# Patient Record
Sex: FEMALE | Race: WHITE | URBAN - METROPOLITAN AREA
[De-identification: names, ages, dates, MRNs, and addresses within clinical notes are randomized per-mention and may not be internally consistent; named-entity substitution may affect disease eponyms.]

---

## 2021-04-20 ENCOUNTER — OFFICE VISIT (OUTPATIENT)
Dept: URBAN - METROPOLITAN AREA CLINIC 75 | Facility: CLINIC | Age: 71
End: 2021-04-20
Payer: MEDICARE

## 2021-04-20 PROCEDURE — 92133 CPTRZD OPH DX IMG PST SGM ON: CPT | Performed by: OPTOMETRIST

## 2021-04-20 PROCEDURE — 92134 CPTRZ OPH DX IMG PST SGM RTA: CPT | Performed by: OPTOMETRIST

## 2021-04-20 PROCEDURE — 99204 OFFICE O/P NEW MOD 45 MIN: CPT | Performed by: OPTOMETRIST

## 2021-04-20 ASSESSMENT — KERATOMETRY
OD: 42.13
OS: 41.00

## 2021-04-20 ASSESSMENT — INTRAOCULAR PRESSURE
OS: 16
OD: 23

## 2021-04-20 ASSESSMENT — VISUAL ACUITY
OS: 20/30
OD: 20/30

## 2021-04-20 NOTE — IMPRESSION/PLAN
Impression: Combined forms of age-related cataract, bilateral: H25.813. Good dilator. No retinal clearance needed. Plan: Cataracts account for the patient's complaints. Discussed all risks, benefits, procedures and recovery. Patient understands changing glasses will not improve vision. Patient desires to have surgery.

## 2021-04-20 NOTE — IMPRESSION/PLAN
Impression: Dry eye syndrome of bilateral lacrimal glands: H04.12-Dry eyes account for the patient's complaints. Plan: Recommend artifical tears 2-3x daily & warm compresses at night time. Educated pt on brands of good lubricating drops.

## 2021-04-20 NOTE — IMPRESSION/PLAN
Impression: Open angle with borderline findings, low risk, bilateral: H40.013- IOP stable. G-suspect based on appearance of nerves. Plan: Observe.

## 2021-06-22 ENCOUNTER — PRE-OPERATIVE VISIT (OUTPATIENT)
Dept: URBAN - METROPOLITAN AREA CLINIC 71 | Facility: CLINIC | Age: 71
End: 2021-06-22
Payer: MEDICARE

## 2021-06-22 DIAGNOSIS — H43.813 VITREOUS DEGENERATION, BILATERAL: ICD-10-CM

## 2021-06-22 DIAGNOSIS — H25.812 COMBINED FORMS OF AGE-RELATED CATARACT, LEFT EYE: ICD-10-CM

## 2021-06-22 PROCEDURE — 92136 OPHTHALMIC BIOMETRY: CPT | Performed by: OPHTHALMOLOGY

## 2021-06-22 PROCEDURE — 92012 INTRM OPH EXAM EST PATIENT: CPT | Performed by: OPHTHALMOLOGY

## 2021-06-22 ASSESSMENT — PACHYMETRY
OD: 25.07
OS: 3.30
OD: 3.13
OS: 24.88

## 2021-07-02 ENCOUNTER — OFFICE VISIT (OUTPATIENT)
Dept: URBAN - METROPOLITAN AREA CLINIC 70 | Facility: CLINIC | Age: 71
End: 2021-07-02
Payer: MEDICARE

## 2021-07-02 DIAGNOSIS — H40.013 OPEN ANGLE WITH BORDERLINE FINDINGS, LOW RISK, BILATERAL: ICD-10-CM

## 2021-07-02 DIAGNOSIS — H25.813 COMBINED FORMS OF AGE-RELATED CATARACT, BILATERAL: Primary | ICD-10-CM

## 2021-07-02 PROCEDURE — 92134 CPTRZ OPH DX IMG PST SGM RTA: CPT | Performed by: OPHTHALMOLOGY

## 2021-07-02 PROCEDURE — 99204 OFFICE O/P NEW MOD 45 MIN: CPT | Performed by: OPHTHALMOLOGY

## 2021-07-02 ASSESSMENT — INTRAOCULAR PRESSURE
OD: 22
OS: 16

## 2021-07-02 NOTE — IMPRESSION/PLAN
Impression: Combined Cataracts, OU. Plan: Exam and OCT reveal the retina is stable. Schedule cataract surgery with [facility] [address] [place] [address].

## 2021-07-07 NOTE — IMPRESSION/PLAN
Impression: Combined forms of age-related cataract, bilateral: H25.813. Plan: Discussed diagnosis in detail with patient. Discussed risks and benefits and patient understands. Discussed treatment options with patient. Surgical treatment is necessary to improve vision. Surgical risks and benefits were discussed, explained and understood by patient. Patient elects to have surgery. Pre op instructions given and understood. Lid scrubs and hygiene were explained. Patient instructed to use artificial tears as needed. Post op instructions given and understood. Continue using current medication(s). PROCEED WITH CARE PLUS CATARACT SURGERY WITH ORA, OD FIRST, NEAR, THEN OS, DISTANCE, DEXYCU, CS DONE.

## 2021-07-12 ENCOUNTER — SURGERY (OUTPATIENT)
Dept: URBAN - METROPOLITAN AREA SURGERY 45 | Facility: SURGERY | Age: 71
End: 2021-07-12
Payer: MEDICARE

## 2021-07-12 PROCEDURE — G8918 PT W/O PREOP ORDER IV AB PRO: HCPCS | Performed by: CLINIC/CENTER

## 2021-07-12 PROCEDURE — G8907 PT DOC NO EVENTS ON DISCHARG: HCPCS | Performed by: CLINIC/CENTER

## 2021-07-13 ENCOUNTER — POST-OPERATIVE VISIT (OUTPATIENT)
Dept: URBAN - METROPOLITAN AREA CLINIC 75 | Facility: CLINIC | Age: 71
End: 2021-07-13
Payer: MEDICARE

## 2021-07-13 DIAGNOSIS — Z48.810 ENCOUNTER FOR SURGICAL AFTERCARE FOLLOWING SURGERY ON A SENSE ORGAN: Primary | ICD-10-CM

## 2021-07-13 PROCEDURE — 99024 POSTOP FOLLOW-UP VISIT: CPT | Performed by: OPHTHALMOLOGY

## 2021-07-13 ASSESSMENT — INTRAOCULAR PRESSURE
OD: 18
OS: 10

## 2021-07-13 NOTE — IMPRESSION/PLAN
Impression:  Encounter for surgical aftercare following surgery on a sense organ  Z48.810. Plan: Eye looks good.   Ok to proceed with fellow eye

## 2021-07-19 ENCOUNTER — SURGERY (OUTPATIENT)
Dept: URBAN - METROPOLITAN AREA SURGERY 45 | Facility: SURGERY | Age: 71
End: 2021-07-19
Payer: MEDICARE

## 2021-07-19 ENCOUNTER — SURGERY (OUTPATIENT)
Dept: URBAN - METROPOLITAN AREA SURGERY 44 | Facility: SURGERY | Age: 71
End: 2021-07-19
Payer: MEDICARE

## 2021-07-19 PROCEDURE — 66984 XCAPSL CTRC RMVL W/O ECP: CPT | Performed by: OPHTHALMOLOGY

## 2021-07-19 PROCEDURE — G8918 PT W/O PREOP ORDER IV AB PRO: HCPCS | Performed by: CLINIC/CENTER

## 2021-07-19 PROCEDURE — G8907 PT DOC NO EVENTS ON DISCHARG: HCPCS | Performed by: CLINIC/CENTER

## 2021-07-20 ENCOUNTER — POST-OPERATIVE VISIT (OUTPATIENT)
Dept: URBAN - METROPOLITAN AREA CLINIC 75 | Facility: CLINIC | Age: 71
End: 2021-07-20
Payer: MEDICARE

## 2021-07-20 DIAGNOSIS — Z96.1 PRESENCE OF INTRAOCULAR LENS: Primary | ICD-10-CM

## 2021-07-20 PROCEDURE — 99024 POSTOP FOLLOW-UP VISIT: CPT | Performed by: OPTOMETRIST

## 2021-07-20 ASSESSMENT — INTRAOCULAR PRESSURE
OS: 15
OD: 15

## 2021-07-20 NOTE — IMPRESSION/PLAN
Impression: S/P Cataract Extraction by phacoemulsification with IOL placement; ORA OS - 1 Day. Presence of intraocular lens  Z96.1- Educated patient on findings. Plan: Recommend artificial tears 2-3x daily to promote healing. IOL's centered. Retinal Detachment signs and symptoms explained.
Yes

## 2021-08-02 ENCOUNTER — SURGERY (OUTPATIENT)
Dept: URBAN - METROPOLITAN AREA SURGERY 44 | Facility: SURGERY | Age: 71
End: 2021-08-02
Payer: MEDICARE

## 2021-08-02 PROCEDURE — 66984 XCAPSL CTRC RMVL W/O ECP: CPT | Performed by: OPHTHALMOLOGY

## 2021-08-17 ENCOUNTER — POST-OPERATIVE VISIT (OUTPATIENT)
Dept: URBAN - METROPOLITAN AREA CLINIC 75 | Facility: CLINIC | Age: 71
End: 2021-08-17
Payer: MEDICARE

## 2021-08-17 DIAGNOSIS — H52.4 PRESBYOPIA: Primary | ICD-10-CM

## 2021-08-17 PROCEDURE — 92015 DETERMINE REFRACTIVE STATE: CPT | Performed by: OPTOMETRIST

## 2021-08-17 PROCEDURE — 99024 POSTOP FOLLOW-UP VISIT: CPT | Performed by: OPTOMETRIST

## 2021-08-17 PROCEDURE — 92310 CONTACT LENS FITTING OU: CPT | Performed by: OPTOMETRIST

## 2021-08-17 ASSESSMENT — VISUAL ACUITY
OS: 20/20
OD: 20/20

## 2021-08-17 ASSESSMENT — INTRAOCULAR PRESSURE
OS: 8
OD: 15

## 2021-08-17 NOTE — IMPRESSION/PLAN
Impression: S/P Cataract Extraction by phacoemulsification with IOL placement; ORA OU - 29 Days. Presence of intraocular lens  Z96.1- Educated pt on findings. Refraction completed. Recommend contact in OS to help give her a better distance prescription. Pt elects to use CTL in OS only. Plan: Updated glasses and CTL OS RX dispensed. Recommend pt continue use of Systane OU.

## 2022-03-25 ENCOUNTER — OFFICE VISIT (OUTPATIENT)
Dept: URBAN - METROPOLITAN AREA CLINIC 71 | Facility: CLINIC | Age: 72
End: 2022-03-25
Payer: MEDICARE

## 2022-03-25 DIAGNOSIS — H04.123 DRY EYE SYNDROME OF BILATERAL LACRIMAL GLANDS: ICD-10-CM

## 2022-03-25 PROCEDURE — 99214 OFFICE O/P EST MOD 30 MIN: CPT | Performed by: OPHTHALMOLOGY

## 2022-03-25 ASSESSMENT — INTRAOCULAR PRESSURE
OD: 16
OS: 12

## 2022-03-25 NOTE — IMPRESSION/PLAN
Impression: Presence of intraocular lens: Z96.1. Monovision Plan: Informed patient that her issues with distance vision are due to the monovision chosen at the time of cataract surgery. Informed patient that adjustment could be made with refractive surgery, or but glasses Rx can help improve vision or consider getting contact lens for OD to help balance the eyes.

## 2022-03-25 NOTE — IMPRESSION/PLAN
Impression: Vitreous degeneration, bilateral: H43.813. No holes or tears Plan: Discussed. Contact office if any new or worsening symptoms.  Continue to monitor

## 2022-03-25 NOTE — IMPRESSION/PLAN
Impression: Dry eye syndrome of bilateral lacrimal glands: H04.123.  Plan: Recommend ATs up to QID for dryness

## 2022-05-05 ENCOUNTER — OFFICE VISIT (OUTPATIENT)
Dept: URBAN - METROPOLITAN AREA CLINIC 71 | Facility: CLINIC | Age: 72
End: 2022-05-05

## 2022-05-05 PROCEDURE — 92310 CONTACT LENS FITTING OU: CPT

## 2022-05-05 ASSESSMENT — VISUAL ACUITY
OD: 20/20
OS: 20/20

## 2022-05-05 ASSESSMENT — INTRAOCULAR PRESSURE
OD: 14
OS: 12

## 2022-05-05 NOTE — IMPRESSION/PLAN
Impression: Presbyopia: H52.4. Will trial contact lens for 1-2 weeks and if patient likes contact lens, will finalize Rx. BCVA in OS w/ contact lens: 20/20-1 Patient to trial OD contact lens as well. Plan: Dispensed new glasses Rx today and discussed changes and possible adaptation period. Patient to call if any issues with new glasses or occur.

## 2022-05-12 ENCOUNTER — OFFICE VISIT (OUTPATIENT)
Dept: URBAN - METROPOLITAN AREA CLINIC 71 | Facility: CLINIC | Age: 72
End: 2022-05-12
Payer: MEDICARE

## 2022-05-12 DIAGNOSIS — Z96.1 PRESENCE OF INTRAOCULAR LENS: ICD-10-CM

## 2022-05-12 DIAGNOSIS — H52.4 PRESBYOPIA: ICD-10-CM

## 2022-05-12 DIAGNOSIS — H04.123 DRY EYE SYNDROME OF BILATERAL LACRIMAL GLANDS: Primary | ICD-10-CM

## 2022-05-12 DIAGNOSIS — H52.223 REGULAR ASTIGMATISM, BILATERAL: ICD-10-CM

## 2022-05-12 PROCEDURE — 92310 CONTACT LENS FITTING OU: CPT

## 2022-05-12 ASSESSMENT — KERATOMETRY
OD: 41.49
OS: 40.63

## 2022-05-12 ASSESSMENT — INTRAOCULAR PRESSURE
OS: 16
OD: 15

## 2022-05-12 NOTE — IMPRESSION/PLAN
Impression: Regular astigmatism, bilateral: H52.223.
+0.75. OD rotated right by 10 degrees. OS not rotated. Plan: Dispensed new contact lens Rx today. Patient to call if any issues with contact lenses occur.

## 2023-09-01 ENCOUNTER — OFFICE VISIT (OUTPATIENT)
Dept: URBAN - METROPOLITAN AREA CLINIC 71 | Facility: CLINIC | Age: 73
End: 2023-09-01
Payer: MEDICARE

## 2023-09-01 DIAGNOSIS — H43.813 VITREOUS DEGENERATION, BILATERAL: Primary | ICD-10-CM

## 2023-09-01 DIAGNOSIS — H52.4 PRESBYOPIA: ICD-10-CM

## 2023-09-01 PROCEDURE — 92310 CONTACT LENS FITTING OU: CPT

## 2023-09-01 PROCEDURE — 99213 OFFICE O/P EST LOW 20 MIN: CPT

## 2023-09-01 ASSESSMENT — VISUAL ACUITY
OD: 20/25
OS: 20/20

## 2023-09-01 ASSESSMENT — KERATOMETRY
OD: 41.75
OS: 40.88

## 2023-09-01 ASSESSMENT — INTRAOCULAR PRESSURE
OS: 12
OD: 14

## 2023-09-08 ENCOUNTER — TESTING ONLY (OUTPATIENT)
Dept: URBAN - METROPOLITAN AREA CLINIC 71 | Facility: CLINIC | Age: 73
End: 2023-09-08

## 2023-09-08 DIAGNOSIS — H52.4 PRESBYOPIA: Primary | ICD-10-CM

## 2023-09-08 PROCEDURE — 92310 CONTACT LENS FITTING OU: CPT

## 2023-09-08 ASSESSMENT — INTRAOCULAR PRESSURE
OS: 13
OD: 13

## 2024-09-09 ENCOUNTER — OFFICE VISIT (OUTPATIENT)
Dept: URBAN - METROPOLITAN AREA CLINIC 71 | Facility: CLINIC | Age: 74
End: 2024-09-09

## 2024-09-09 DIAGNOSIS — H52.4 PRESBYOPIA: Primary | ICD-10-CM

## 2024-09-09 PROCEDURE — 92012 INTRM OPH EXAM EST PATIENT: CPT

## 2024-09-09 PROCEDURE — 92310 CONTACT LENS FITTING OU: CPT

## 2024-09-09 ASSESSMENT — INTRAOCULAR PRESSURE
OD: 18
OS: 16

## 2024-09-09 ASSESSMENT — VISUAL ACUITY
OS: 20/20
OD: 20/20